# Patient Record
Sex: FEMALE | Race: WHITE | Employment: PART TIME | URBAN - METROPOLITAN AREA
[De-identification: names, ages, dates, MRNs, and addresses within clinical notes are randomized per-mention and may not be internally consistent; named-entity substitution may affect disease eponyms.]

---

## 2017-11-17 ENCOUNTER — HOSPITAL ENCOUNTER (OUTPATIENT)
Age: 42
Discharge: HOME | End: 2017-11-17
Payer: COMMERCIAL

## 2017-11-17 VITALS
DIASTOLIC BLOOD PRESSURE: 95 MMHG | RESPIRATION RATE: 20 BRPM | OXYGEN SATURATION: 100 % | WEIGHT: 125 LBS | HEART RATE: 82 BPM | SYSTOLIC BLOOD PRESSURE: 140 MMHG | BODY MASS INDEX: 23 KG/M2 | TEMPERATURE: 99 F

## 2017-11-17 DIAGNOSIS — J01.00 ACUTE NON-RECURRENT MAXILLARY SINUSITIS: Primary | ICD-10-CM

## 2017-11-17 PROCEDURE — 99214 OFFICE O/P EST MOD 30 MIN: CPT

## 2017-11-17 PROCEDURE — 99213 OFFICE O/P EST LOW 20 MIN: CPT

## 2017-11-17 RX ORDER — AMOXICILLIN AND CLAVULANATE POTASSIUM 875; 125 MG/1; MG/1
1 TABLET, FILM COATED ORAL 2 TIMES DAILY
Qty: 20 TABLET | Refills: 0 | Status: SHIPPED | OUTPATIENT
Start: 2017-11-17 | End: 2017-11-27

## 2017-11-17 RX ORDER — NAPROXEN 500 MG/1
500 TABLET ORAL 2 TIMES DAILY WITH MEALS
COMMUNITY

## 2017-11-17 NOTE — ED INITIAL ASSESSMENT (HPI)
2 1/2 weeks ago they said she had viral infection, still coughing, fatigue, complaint of under left breast pain, she thinks from coughing.

## 2017-11-18 NOTE — ED PROVIDER NOTES
Patient Seen in: [id number] [facility]    History   Patient presents with:  Cough/URI    Stated Complaint: flu like systems    12-year-old female who presents to the immediate care with complaints of sinus pain/pressure for the past 2 and half reviewed and are negative. Positive for stated complaint: flu like systems  Other systems are as noted in HPI. Constitutional and vital signs reviewed. All other systems reviewed and negative except as noted above.     Physical Exam   ED Triage V behavior is normal. Judgment and thought content normal.   Nursing note and vitals reviewed.       ED Course   Labs Reviewed - No data to display    ED Course as of Nov 17 1954  ------------------------------------------------------------       [facility]

## 2018-04-22 ENCOUNTER — APPOINTMENT (OUTPATIENT)
Dept: CT IMAGING | Age: 43
End: 2018-04-22
Attending: FAMILY MEDICINE
Payer: COMMERCIAL

## 2018-04-22 ENCOUNTER — HOSPITAL ENCOUNTER (OUTPATIENT)
Age: 43
Discharge: HOME | End: 2018-04-22
Attending: FAMILY MEDICINE
Payer: COMMERCIAL

## 2018-04-22 VITALS
RESPIRATION RATE: 16 BRPM | TEMPERATURE: 97 F | OXYGEN SATURATION: 100 % | HEART RATE: 76 BPM | BODY MASS INDEX: 23 KG/M2 | DIASTOLIC BLOOD PRESSURE: 78 MMHG | SYSTOLIC BLOOD PRESSURE: 145 MMHG | WEIGHT: 127 LBS

## 2018-04-22 DIAGNOSIS — N20.0 NEPHROLITHIASIS: ICD-10-CM

## 2018-04-22 DIAGNOSIS — R42 LIGHTHEADEDNESS: ICD-10-CM

## 2018-04-22 DIAGNOSIS — Z98.84 HISTORY OF ROUX-EN-Y GASTRIC BYPASS: ICD-10-CM

## 2018-04-22 DIAGNOSIS — D64.9 ANEMIA, UNSPECIFIED TYPE: ICD-10-CM

## 2018-04-22 DIAGNOSIS — R11.2 NON-INTRACTABLE VOMITING WITH NAUSEA, UNSPECIFIED VOMITING TYPE: ICD-10-CM

## 2018-04-22 DIAGNOSIS — R10.9 ABDOMINAL PAIN, ACUTE: Primary | ICD-10-CM

## 2018-04-22 PROCEDURE — 85025 COMPLETE CBC W/AUTO DIFF WBC: CPT | Performed by: FAMILY MEDICINE

## 2018-04-22 PROCEDURE — 81002 URINALYSIS NONAUTO W/O SCOPE: CPT | Performed by: FAMILY MEDICINE

## 2018-04-22 PROCEDURE — 99214 OFFICE O/P EST MOD 30 MIN: CPT

## 2018-04-22 PROCEDURE — 74176 CT ABD & PELVIS W/O CONTRAST: CPT | Performed by: FAMILY MEDICINE

## 2018-04-22 PROCEDURE — 81025 URINE PREGNANCY TEST: CPT | Performed by: FAMILY MEDICINE

## 2018-04-22 PROCEDURE — 96361 HYDRATE IV INFUSION ADD-ON: CPT

## 2018-04-22 PROCEDURE — 99215 OFFICE O/P EST HI 40 MIN: CPT

## 2018-04-22 PROCEDURE — 82962 GLUCOSE BLOOD TEST: CPT

## 2018-04-22 PROCEDURE — 80047 BASIC METABLC PNL IONIZED CA: CPT

## 2018-04-22 PROCEDURE — 96374 THER/PROPH/DIAG INJ IV PUSH: CPT

## 2018-04-22 RX ORDER — ONDANSETRON 4 MG/1
4 TABLET, ORALLY DISINTEGRATING ORAL EVERY 8 HOURS PRN
Qty: 10 TABLET | Refills: 0 | Status: SHIPPED | OUTPATIENT
Start: 2018-04-22 | End: 2018-04-22 | Stop reason: SDUPTHER

## 2018-04-22 RX ORDER — SODIUM CHLORIDE 9 MG/ML
1000 INJECTION, SOLUTION INTRAVENOUS ONCE
Status: COMPLETED | OUTPATIENT
Start: 2018-04-22 | End: 2018-04-22

## 2018-04-22 RX ORDER — ONDANSETRON 2 MG/ML
4 INJECTION INTRAMUSCULAR; INTRAVENOUS ONCE
Status: COMPLETED | OUTPATIENT
Start: 2018-04-22 | End: 2018-04-22

## 2018-04-22 RX ORDER — ONDANSETRON 4 MG/1
4 TABLET, ORALLY DISINTEGRATING ORAL EVERY 8 HOURS PRN
Qty: 10 TABLET | Refills: 0 | Status: SHIPPED | OUTPATIENT
Start: 2018-04-22 | End: 2018-04-29

## 2018-05-24 ENCOUNTER — HOSPITAL ENCOUNTER (OUTPATIENT)
Age: 43
Discharge: HOME | End: 2018-05-24
Attending: FAMILY MEDICINE
Payer: COMMERCIAL

## 2018-05-24 VITALS
RESPIRATION RATE: 16 BRPM | BODY MASS INDEX: 23 KG/M2 | WEIGHT: 125 LBS | SYSTOLIC BLOOD PRESSURE: 128 MMHG | TEMPERATURE: 99 F | DIASTOLIC BLOOD PRESSURE: 82 MMHG | HEART RATE: 86 BPM | OXYGEN SATURATION: 98 %

## 2018-05-24 DIAGNOSIS — L25.9 CONTACT DERMATITIS, UNSPECIFIED CONTACT DERMATITIS TYPE, UNSPECIFIED TRIGGER: Primary | ICD-10-CM

## 2018-05-24 PROCEDURE — 99214 OFFICE O/P EST MOD 30 MIN: CPT

## 2018-05-24 PROCEDURE — 96372 THER/PROPH/DIAG INJ SC/IM: CPT

## 2018-05-24 RX ORDER — FERROUS SULFATE TAB EC 324 MG (65 MG FE EQUIVALENT) 324 (65 FE) MG
TABLET DELAYED RESPONSE ORAL
COMMUNITY

## 2018-05-24 RX ORDER — PREDNISONE 20 MG/1
20 TABLET ORAL DAILY
Qty: 5 TABLET | Refills: 0 | Status: SHIPPED | OUTPATIENT
Start: 2018-05-24 | End: 2018-05-29

## 2018-05-24 RX ORDER — METHYLPREDNISOLONE SODIUM SUCCINATE 125 MG/2ML
125 INJECTION, POWDER, LYOPHILIZED, FOR SOLUTION INTRAMUSCULAR; INTRAVENOUS ONCE
Status: COMPLETED | OUTPATIENT
Start: 2018-05-24 | End: 2018-05-24

## 2018-05-24 NOTE — ED INITIAL ASSESSMENT (HPI)
Pt presents to [facility] with alfredo since yesterday. Pt states she started itching the day before and then noticed the rash yesterday. Pt states rash is very itchy, and has spread from her wrists to her (antecubital) arm and now to her neck.  Pt denies shortness of

## 2018-05-24 NOTE — ED NOTES
Pt was walking to her car and she states her ears started ringing, and she became dizzy and clammy. Pt returned to room #1 and placed on the cart lying flat. Pt vitals normal, skin pale. /68, P 72, R 16, and pulse oximeter 98% on room air.  Warm blank

## 2018-05-24 NOTE — ED NOTES
Pt states she became clammy, dizzy and her ears started ringing on the way to her car. Pt brought back to exam room and is now laying flat. Pt states ears are no longer ringing, vital stable. /68, P72, R16, and ulse oximeter 98% on room air.  Pt pale,

## 2018-05-24 NOTE — ED PROVIDER NOTES
Patient Seen in: [address] [facility]    History   Patient presents with:  Rash    Stated Complaint: rash on arms wrist and neck x yesterday     HPI   Patient is a 20-year-old female that presents with 2 day history of itchy rash.   Patient sta Current:/82   Pulse 86   Temp 98.6 °F (37 °C) (Temporal)   Resp 16   Wt 56.7 kg   LMP 05/17/2018 (Exact Date)   SpO2 98%   BMI 22.86 kg/m²         Physical Exam   Constitutional: She is oriented to person, place, and time.  She appears well-developed Take 1 tablet (20 mg total) by mouth daily.  Start taking this medication on 05/25/2018  Qty: 5 tablet Refills: 0

## 2018-05-24 NOTE — ED NOTES
Pt states she feels like she can go home now, skin pink, warm and dry, and pt denies ear ringing or dizziness. Gait steady, walked to front entrance.

## 2021-09-21 NOTE — ED PROVIDER NOTES
Patient Seen in: [address] [facility]    History   Patient presents with:  Nausea    Stated Complaint: faint-feeling; shaky X 2 days    HPI    Patient with a history of jose-en-y bypass in 2004, here with nausea and vomiting without hematemesi auscultation bilaterally  ABDOMEN: Soft, + bowel sounds. Some epigastric and RLQ tenderness.  No rebound, rigidity, or guarding  BACK: No CVA tenderness  EXTREMITIES: No clubbing, cyanosis, or edema  SKIN: No rash, pale        ED Course     Labs Reviewed plane.  Dose reduction techniques were used. Dose information is transmitted to the [facility] NRDR ([address]) which includes the Dose Index Registry.   PATIENT STATED HISTORY: (As transcribed by Technologist treatment discussed. Zofran PRN for nausea/vomiting. Hydrate. Incidental nephrolithiasis noted on CT. Advance diet as tolerated. Start daily iron supplements for anemia and take with vitamin C containing foods. See AVS for details.  Monitor symptoms for 3

## (undated) NOTE — LETTER
Date & Time: 4/22/2018, 3:43 PM  Patient: Shekhar Chappell  Encounter Provider(s):    Shira Zapata DO       To Whom It May Concern:    Shekhar Chappell was seen and treated in our department on 4/22/2018. She should not return to work until 04/24/18.     If